# Patient Record
Sex: FEMALE | Race: BLACK OR AFRICAN AMERICAN | Employment: FULL TIME | URBAN - METROPOLITAN AREA
[De-identification: names, ages, dates, MRNs, and addresses within clinical notes are randomized per-mention and may not be internally consistent; named-entity substitution may affect disease eponyms.]

---

## 2019-06-01 ENCOUNTER — HOSPITAL ENCOUNTER (EMERGENCY)
Age: 24
Discharge: HOME | End: 2019-06-01
Attending: EMERGENCY MEDICINE
Payer: MEDICAID

## 2019-06-01 VITALS
HEART RATE: 74 BPM | RESPIRATION RATE: 16 BRPM | OXYGEN SATURATION: 100 % | TEMPERATURE: 98.1 F | SYSTOLIC BLOOD PRESSURE: 125 MMHG | DIASTOLIC BLOOD PRESSURE: 50 MMHG

## 2019-06-01 DIAGNOSIS — F41.0 ANXIETY ATTACK: Primary | ICD-10-CM

## 2019-06-01 PROCEDURE — 99284 EMERGENCY DEPT VISIT MOD MDM: CPT | Performed by: EMERGENCY MEDICINE

## 2019-06-01 PROCEDURE — 74011250637 HC RX REV CODE- 250/637: Performed by: EMERGENCY MEDICINE

## 2019-06-01 RX ORDER — DIAZEPAM 2 MG/1
2 TABLET ORAL ONCE
Status: COMPLETED | OUTPATIENT
Start: 2019-06-01 | End: 2019-06-01

## 2019-06-01 RX ORDER — HYDROXYZINE PAMOATE 25 MG/1
25 CAPSULE ORAL
COMMUNITY

## 2019-06-01 RX ADMIN — DIAZEPAM 2 MG: 2 TABLET ORAL at 17:58

## 2019-06-01 NOTE — ED NOTES
I have reviewed discharge instructions with the patient. The patient verbalized understanding. Patient left ED via Discharge Method: ambulatory to Home with self. Opportunity for questions and clarification provided. Patient given 0 scripts. To continue your aftercare when you leave the hospital, you may receive an automated call from our care team to check in on how you are doing. This is a free service and part of our promise to provide the best care and service to meet your aftercare needs.  If you have questions, or wish to unsubscribe from this service please call [phone]. Thank you for Choosing our [facility] Emergency Department.

## 2019-06-01 NOTE — ED TRIAGE NOTES
Pt to ED via EMS from work c/o anxiety and dizziness. Pt tells staff upon arrival that she had syncopal episode but did not inform EMS. Pt bgl 78, VSS. Pt tried on a few different medications for anxiety but none have sufficiently controlled anxiety.

## 2019-06-01 NOTE — ED PROVIDER NOTES
82-year-old female presents with concerns about having had a panic attack. Patient says that she has very bad anxiety and she is prone to panic attacks. She says her more severe attacks cause her to pass out and that is what happened today. She says she was at work and started to feel hot and sweaty and then felt like she was going to pass out. She said her coworkers were able to help her sit down and then she was out for just a couple of seconds. EMS was then called who brought her here for further evaluation. Patient says that she takes hydroxyzine for her anxiety and panic attacks and she is also on an antidepressant that she thinks may be fluvoxamine. Patient says she has appointment with her primary care doctor on June 13 for further treatment of this. She does note that over the last year it seems like she's had more frequent anxiety and panic attacks. Currently she says she just feels a little off and jittery. She still feels a little anxious but she's not nearly as panicky as she was before. She denies chest pain or difficulty breathing. Elements of this note were created using speech recognition software. As such, errors of speech recognition may be present. No past medical history on file. No past surgical history on file. No family history on file.     Social History     Socioeconomic History    Marital status: Not on file     Spouse name: Not on file    Number of children: Not on file    Years of education: Not on file    Highest education level: Not on file   Occupational History    Not on file   Social Needs    Financial resource strain: Not on file    Food insecurity:     Worry: Not on file     Inability: Not on file    Transportation needs:     Medical: Not on file     Non-medical: Not on file   Tobacco Use    Smoking status: Not on file   Substance and Sexual Activity    Alcohol use: Not on file    Drug use: Not on file    Sexual activity: Not on file Lifestyle    Physical activity:     Days per week: Not on file     Minutes per session: Not on file    Stress: Not on file   Relationships    Social connections:     Talks on phone: Not on file     Gets together: Not on file     Attends [employer]ars[employer] service: Not on file     Active member of club or organization: Not on file     Attends meetings of clubs or organizations: Not on file     Relationship status: Not on file    Intimate partner violence:     Fear of current or ex partner: Not on file     Emotionally abused: Not on file     Physically abused: Not on file     Forced sexual activity: Not on file   Other Topics Concern    Not on file   Social History Narrative    Not on file         ALLERGIES: Shellfish derived    Review of Systems   Constitutional: Negative for chills, diaphoresis and fever. Eyes: Negative for redness and visual disturbance. Respiratory: Negative for cough, chest tightness, shortness of breath and wheezing. Cardiovascular: Negative for chest pain and palpitations. Gastrointestinal: Negative for abdominal pain, blood in stool, diarrhea, nausea and vomiting. Neurological: Negative for dizziness, weakness and headaches. Hematological: Negative for adenopathy. Does not bruise/bleed easily. Psychiatric/Behavioral: Negative for confusion, decreased concentration, dysphoric mood, self-injury and sleep disturbance. The patient is nervous/anxious. Vitals:    06/01/19 1741   BP: 119/57   Pulse: 79   Resp: 18   Temp: 98.8 °F (37.1 °C)   SpO2: 100%            Physical Exam   Constitutional: She is oriented to person, place, and time. She appears well-developed and well-nourished. Cardiovascular: Normal rate, regular rhythm and normal heart sounds. Pulmonary/Chest: Effort normal and breath sounds normal.   Neurological: She is alert and oriented to person, place, and time. Coordination normal.   Patient ambulatory without difficulty. Nursing note and vitals reviewed. MDM  Number of Diagnoses or Management Options  Diagnosis management comments: I do not think she needs any significant evaluation including blood work or imaging at this time. I will give her a small dose of Valium at this time and discharge her home.          Procedures

## 2019-06-01 NOTE — DISCHARGE INSTRUCTIONS
As we discussed, it is important for you to follow up with your primary care doctor or with a psychiatrist for further dilation. Please return with any fevers, vomiting, worsening symptoms, or additional concerns.